# Patient Record
Sex: FEMALE | Race: BLACK OR AFRICAN AMERICAN
[De-identification: names, ages, dates, MRNs, and addresses within clinical notes are randomized per-mention and may not be internally consistent; named-entity substitution may affect disease eponyms.]

---

## 2022-01-31 ENCOUNTER — HOSPITAL ENCOUNTER (OUTPATIENT)
Dept: HOSPITAL 61 - SURGPAT | Age: 72
End: 2022-01-31
Attending: ORTHOPAEDIC SURGERY
Payer: MEDICARE

## 2022-01-31 DIAGNOSIS — M47.819: ICD-10-CM

## 2022-01-31 DIAGNOSIS — I10: ICD-10-CM

## 2022-01-31 DIAGNOSIS — Z79.899: ICD-10-CM

## 2022-01-31 DIAGNOSIS — M17.12: ICD-10-CM

## 2022-01-31 DIAGNOSIS — Z01.818: Primary | ICD-10-CM

## 2022-01-31 LAB
ALBUMIN SERPL-MCNC: 3.3 G/DL (ref 3.4–5)
ANION GAP SERPL CALC-SCNC: 8 MMOL/L (ref 6–14)
APTT BLD: 48 SEC (ref 24–38)
BASOPHILS # BLD AUTO: 0 X10^3/UL (ref 0–0.2)
BASOPHILS NFR BLD: 1 % (ref 0–3)
BUN SERPL-MCNC: 20 MG/DL (ref 7–20)
CALCIUM SERPL-MCNC: 9.4 MG/DL (ref 8.5–10.1)
CHLORIDE SERPL-SCNC: 104 MMOL/L (ref 98–107)
CO2 SERPL-SCNC: 31 MMOL/L (ref 21–32)
CREAT SERPL-MCNC: 0.9 MG/DL (ref 0.6–1)
EOSINOPHIL NFR BLD: 0.1 X10^3/UL (ref 0–0.7)
EOSINOPHIL NFR BLD: 2 % (ref 0–3)
ERYTHROCYTE [DISTWIDTH] IN BLOOD BY AUTOMATED COUNT: 14.1 % (ref 11.5–14.5)
GFR SERPLBLD BASED ON 1.73 SQ M-ARVRAT: 74.9 ML/MIN
GLUCOSE SERPL-MCNC: 102 MG/DL (ref 70–99)
HCT VFR BLD CALC: 42.8 % (ref 36–47)
HGB BLD-MCNC: 13.5 G/DL (ref 12–15.5)
LYMPHOCYTES # BLD: 1.4 X10^3/UL (ref 1–4.8)
LYMPHOCYTES NFR BLD AUTO: 24 % (ref 24–48)
MCH RBC QN AUTO: 26 PG (ref 25–35)
MCHC RBC AUTO-ENTMCNC: 31 G/DL (ref 31–37)
MCV RBC AUTO: 84 FL (ref 79–100)
MONO #: 0.4 X10^3/UL (ref 0–1.1)
MONOCYTES NFR BLD: 8 % (ref 0–9)
NEUT #: 3.7 X10^3/UL (ref 1.8–7.7)
NEUTROPHILS NFR BLD AUTO: 65 % (ref 31–73)
PLATELET # BLD AUTO: 333 X10^3/UL (ref 140–400)
POTASSIUM SERPL-SCNC: 5 MMOL/L (ref 3.5–5.1)
PROTHROMBIN TIME: 12.2 SEC (ref 11.7–14)
RBC # BLD AUTO: 5.09 X10^6/UL (ref 3.5–5.4)
SODIUM SERPL-SCNC: 143 MMOL/L (ref 136–145)
WBC # BLD AUTO: 5.6 X10^3/UL (ref 4–11)

## 2022-01-31 PROCEDURE — 82040 ASSAY OF SERUM ALBUMIN: CPT

## 2022-01-31 PROCEDURE — 85025 COMPLETE CBC W/AUTO DIFF WBC: CPT

## 2022-01-31 PROCEDURE — 87641 MR-STAPH DNA AMP PROBE: CPT

## 2022-01-31 PROCEDURE — 85610 PROTHROMBIN TIME: CPT

## 2022-01-31 PROCEDURE — 80048 BASIC METABOLIC PNL TOTAL CA: CPT

## 2022-01-31 PROCEDURE — 85730 THROMBOPLASTIN TIME PARTIAL: CPT

## 2022-01-31 PROCEDURE — 36415 COLL VENOUS BLD VENIPUNCTURE: CPT

## 2022-01-31 PROCEDURE — 82306 VITAMIN D 25 HYDROXY: CPT

## 2022-01-31 PROCEDURE — 83036 HEMOGLOBIN GLYCOSYLATED A1C: CPT

## 2022-01-31 PROCEDURE — 71046 X-RAY EXAM CHEST 2 VIEWS: CPT

## 2022-01-31 PROCEDURE — 93005 ELECTROCARDIOGRAM TRACING: CPT

## 2022-01-31 PROCEDURE — 85651 RBC SED RATE NONAUTOMATED: CPT

## 2022-01-31 NOTE — RAD
EXAMINATION: XR CHEST 2V



CLINICAL HISTORY: Preoperative clearance.



EXAM DATE/TIME: 1/31/2022 12:30 PM



COMPARISON: None





FINDINGS: 



Lines, Tubes, and Devices: None.



Cardiomediastinal Silhouette: Normal heart size. Tortuosity of the thoracic aorta.



Lungs and Pleura: No evidence of focal airspace consolidation or pleural effusion. Pulmonary vasculat
ure unremarkable.



Bones and Soft Tissues: Degenerative changes in the thoracic spine.

 



IMPRESSION:



No evidence of acute cardiopulmonary abnormality.



Electronically signed by: Polo Thomas DO (1/31/2022 1:20 PM) [facility]

## 2022-01-31 NOTE — EKG
[facility]

              [address] [place] [address]

Test Date:    2022               Test Time:    12:02:57

Pat Name:     JANAK VOSS              Department:   

Patient ID:   [id number]           Room:          

Gender:       F                        Technician:   SJ

:          1950               Requested By: KENDRICK HARDING

Order Number: [id number]           Reading MD:   Mega Eid

                                 Measurements

Intervals                              Axis          

Rate:         61                       P:            38

FL:           152                      QRS:          2

QRSD:         86                       T:            12

QT:           410                                    

QTc:          418                                    

                           Interpretive Statements

SINUS RHYTHM

Electronically Signed On 2022 9:05:33 CST by Mega Eid

## 2022-02-01 LAB — HBA1C MFR BLD: 6.3 % (ref 4.8–5.6)

## 2022-02-19 ENCOUNTER — HOSPITAL ENCOUNTER (INPATIENT)
Dept: HOSPITAL 61 - ER | Age: 72
LOS: 2 days | Discharge: HOME | DRG: 862 | End: 2022-02-21
Attending: INTERNAL MEDICINE | Admitting: INTERNAL MEDICINE
Payer: MEDICARE

## 2022-02-19 VITALS — HEIGHT: 67 IN | WEIGHT: 181.44 LBS | BODY MASS INDEX: 28.48 KG/M2

## 2022-02-19 VITALS — DIASTOLIC BLOOD PRESSURE: 65 MMHG | SYSTOLIC BLOOD PRESSURE: 101 MMHG

## 2022-02-19 DIAGNOSIS — M19.90: ICD-10-CM

## 2022-02-19 DIAGNOSIS — Z79.01: ICD-10-CM

## 2022-02-19 DIAGNOSIS — T81.41XA: Primary | ICD-10-CM

## 2022-02-19 DIAGNOSIS — Z87.891: ICD-10-CM

## 2022-02-19 DIAGNOSIS — E78.00: ICD-10-CM

## 2022-02-19 DIAGNOSIS — M25.462: ICD-10-CM

## 2022-02-19 DIAGNOSIS — E43: ICD-10-CM

## 2022-02-19 DIAGNOSIS — Z90.710: ICD-10-CM

## 2022-02-19 DIAGNOSIS — I10: ICD-10-CM

## 2022-02-19 DIAGNOSIS — E78.5: ICD-10-CM

## 2022-02-19 DIAGNOSIS — Z96.652: ICD-10-CM

## 2022-02-19 DIAGNOSIS — K21.9: ICD-10-CM

## 2022-02-19 DIAGNOSIS — Z20.822: ICD-10-CM

## 2022-02-19 DIAGNOSIS — G89.29: ICD-10-CM

## 2022-02-19 LAB
ALBUMIN SERPL-MCNC: 2.5 G/DL (ref 3.4–5)
ALBUMIN/GLOB SERPL: 0.5 {RATIO} (ref 1–1.7)
ALP SERPL-CCNC: 99 U/L (ref 46–116)
ALT SERPL-CCNC: 18 U/L (ref 14–59)
ANION GAP SERPL CALC-SCNC: 8 MMOL/L (ref 6–14)
APTT PPP: YELLOW S
AST SERPL-CCNC: 21 U/L (ref 15–37)
BACTERIA #/AREA URNS HPF: (no result) /HPF
BASOPHILS # BLD AUTO: 0 X10^3/UL (ref 0–0.2)
BASOPHILS NFR BLD: 1 % (ref 0–3)
BILIRUB SERPL-MCNC: 0.6 MG/DL (ref 0.2–1)
BILIRUB UR QL STRIP: NEGATIVE
BUN SERPL-MCNC: 17 MG/DL (ref 7–20)
BUN/CREAT SERPL: 17 (ref 6–20)
CALCIUM SERPL-MCNC: 8.8 MG/DL (ref 8.5–10.1)
CHLORIDE SERPL-SCNC: 104 MMOL/L (ref 98–107)
CO2 SERPL-SCNC: 31 MMOL/L (ref 21–32)
CREAT SERPL-MCNC: 1 MG/DL (ref 0.6–1)
CRP SERPL-MCNC: 144.3 MG/L (ref 0–3.3)
EOSINOPHIL NFR BLD: 0.1 X10^3/UL (ref 0–0.7)
EOSINOPHIL NFR BLD: 1 % (ref 0–3)
ERYTHROCYTE [DISTWIDTH] IN BLOOD BY AUTOMATED COUNT: 14 % (ref 11.5–14.5)
FIBRINOGEN PPP-MCNC: (no result) MG/DL
GFR SERPLBLD BASED ON 1.73 SQ M-ARVRAT: 66.3 ML/MIN
GLUCOSE SERPL-MCNC: 120 MG/DL (ref 70–99)
HCT VFR BLD CALC: 33.9 % (ref 36–47)
HGB BLD-MCNC: 10.9 G/DL (ref 12–15.5)
INFLUENZA A PATIENT: NEGATIVE
INFLUENZA B PATIENT: NEGATIVE
LYMPHOCYTES # BLD: 0.7 X10^3/UL (ref 1–4.8)
LYMPHOCYTES NFR BLD AUTO: 8 % (ref 24–48)
MCH RBC QN AUTO: 27 PG (ref 25–35)
MCHC RBC AUTO-ENTMCNC: 32 G/DL (ref 31–37)
MCV RBC AUTO: 82 FL (ref 79–100)
MONO #: 0.6 X10^3/UL (ref 0–1.1)
MONOCYTES NFR BLD: 6 % (ref 0–9)
NEUT #: 7.7 X10^3/UL (ref 1.8–7.7)
NEUTROPHILS NFR BLD AUTO: 85 % (ref 31–73)
NITRITE UR QL STRIP: NEGATIVE
PH UR STRIP: 5.5 [PH]
PLATELET # BLD AUTO: 500 X10^3/UL (ref 140–400)
POTASSIUM SERPL-SCNC: 3.9 MMOL/L (ref 3.5–5.1)
PROT SERPL-MCNC: 7.6 G/DL (ref 6.4–8.2)
PROT UR STRIP-MCNC: NEGATIVE MG/DL
RBC # BLD AUTO: 4.13 X10^6/UL (ref 3.5–5.4)
RBC #/AREA URNS HPF: 0 /HPF (ref 0–2)
SODIUM SERPL-SCNC: 143 MMOL/L (ref 136–145)
UROBILINOGEN UR-MCNC: 1 MG/DL
WBC # BLD AUTO: 9.1 X10^3/UL (ref 4–11)
WBC #/AREA URNS HPF: (no result) /HPF (ref 0–4)

## 2022-02-19 PROCEDURE — 85610 PROTHROMBIN TIME: CPT

## 2022-02-19 PROCEDURE — 81001 URINALYSIS AUTO W/SCOPE: CPT

## 2022-02-19 PROCEDURE — 87040 BLOOD CULTURE FOR BACTERIA: CPT

## 2022-02-19 PROCEDURE — 85651 RBC SED RATE NONAUTOMATED: CPT

## 2022-02-19 PROCEDURE — 87428 SARSCOV & INF VIR A&B AG IA: CPT

## 2022-02-19 PROCEDURE — 85025 COMPLETE CBC W/AUTO DIFF WBC: CPT

## 2022-02-19 PROCEDURE — 71045 X-RAY EXAM CHEST 1 VIEW: CPT

## 2022-02-19 PROCEDURE — 96374 THER/PROPH/DIAG INJ IV PUSH: CPT

## 2022-02-19 PROCEDURE — 73564 X-RAY EXAM KNEE 4 OR MORE: CPT

## 2022-02-19 PROCEDURE — 86140 C-REACTIVE PROTEIN: CPT

## 2022-02-19 PROCEDURE — G0378 HOSPITAL OBSERVATION PER HR: HCPCS

## 2022-02-19 PROCEDURE — 80053 COMPREHEN METABOLIC PANEL: CPT

## 2022-02-19 PROCEDURE — 96375 TX/PRO/DX INJ NEW DRUG ADDON: CPT

## 2022-02-19 PROCEDURE — 93971 EXTREMITY STUDY: CPT

## 2022-02-19 PROCEDURE — 36415 COLL VENOUS BLD VENIPUNCTURE: CPT

## 2022-02-19 RX ADMIN — OXYCODONE HYDROCHLORIDE AND ACETAMINOPHEN PRN TAB: 5; 325 TABLET ORAL at 19:44

## 2022-02-19 RX ADMIN — ATORVASTATIN CALCIUM SCH MG: 20 TABLET, FILM COATED ORAL at 20:44

## 2022-02-19 NOTE — PHYS DOC
Past Medical History


Past Surgical History:  Hysterectomy, Knee Replacement, Tubal ligation, Other


Additional Past Surgical Histo:  GASTRIC SLEEVE


 (ARPITA STEVEN APRJENELLE)


Smoking Status:  Current Some Day Smoker


 (ARPITA STEVEN APRJENELLE)





General Adult


EDM:


Chief Complaint:  FEVER





HPI:


HPI:





Patient is a 71  year old female who presents with fever for the last 4 days.  

She had a knee replacement done by Dr. Cummings on  and was discharged 

on  to rehab and got home 5 days ago.  She states she is unable to 

keep her fever away.  She states she also has a lot of pain and has been taking 

her Percocet but she is still having pain.  States the pain mainly is in the 

knee itself but down into the shin area too.  Patient states she called the 

doctor and they stated for her to come into the emergency room to be checked.  

She does have a scheduled postoperative appointment on this on Monday.  Patient 

states she last took her Percocet about 1030 this morning.  She does have a low-

grade fever upon arrival.  Patient has a history of hypertension, high 

cholesterol, DJD, gastric sleeve, hysterectomy, tubal ligation and smoking.


 (ARPITA STEVEN APRN)





Review of Systems:


Review of Systems:


Constitutional:   + fever or +chills. []


Eyes:   Denies change in visual acuity. []


HENT:   Denies nasal congestion or sore throat. [] 


Respiratory:   Denies cough or shortness of breath. [] 


Cardiovascular:   Denies chest pain or edema. [] 


GI:   Denies abdominal pain, nausea, vomiting, bloody stools or diarrhea. [] 


:  Denies dysuria. [] 


Musculoskeletal:   Denies back pain or +left knee joint pain. [] 


Integument:   Denies rash. +redness to medial knee [] 


Neurologic:   Denies headache, focal weakness or sensory changes. [] 


Endocrine:   Denies polyuria or polydipsia. [] 


Lymphatic:  Denies swollen glands. [] 


Psychiatric:  Denies depression or anxiety. []


 (ARPITA STEVEN APRJENELLE)





Heart Score:


C/O Chest Pain:  No


 (ARPITA STEVEN)





Current Medications:





Current Medications








 Medications


  (Trade)  Dose


 Ordered  Sig/Nicole  Start Time


 Stop Time Status Last Admin


Dose Admin


 


 Fentanyl Citrate


  (Fentanyl 2ml


 Vial)  50 mcg  1X  ONCE  22 14:15


 22 14:16 UNV  











 (ARPITA STEVEN)





Allergies:


Allergies:





Allergies








Coded Allergies Type Severity Reaction Last Updated Verified


 


  Penicillins Allergy Intermediate  22 Yes








 (ARPITA STEVEN)





Physical Exam:


PE:





Constitutional: Well developed, well nourished, no acute distress, non-toxic shaji

earance. []


HENT: Normocephalic, atraumatic, bilateral external ears normal, oropharynx 

moist, no oral exudates, nose normal. []


Eyes: PERRLA, EOMI, conjunctiva normal, no discharge. [] 


Neck: Normal range of motion, no tenderness, supple, no stridor. [] 


Cardiovascular:Heart rate regular rhythm, no murmur []


Lungs & Thorax:  Bilateral breath sounds clear to auscultation []


Abdomen: Bowel sounds normal, soft, no tenderness, no masses, no pulsatile 

masses. [] 


Skin: Warm, dry, medial left knee erythema, no rash. [] 


Back: No tenderness, no CVA tenderness. [] 


Extremities:  Slight over incision and down into shin tenderness, no cyanosis, 

no clubbing, ROM intact but painful, 1+ edema. [] 


Neurologic: Alert and oriented X 3, normal motor function, normal sensory 

function, no focal deficits noted. []


Psychologic: Affect normal, judgement normal, mood normal. []


 (ARPITA STEVEN)





Current Patient Data:


Vital Signs:





                                   Vital Signs








  Date Time  Temp Pulse Resp B/P (MAP) Pulse Ox O2 Delivery O2 Flow Rate FiO2


 


22 13:50 99.3 98 22 129/68 (88) 95 Room Air  





 99.3       








 (ARPITA STEVEN)





EKG:


EKG:


[]


 (ARPITA STEVEN)





Radiology/Procedures:


Radiology/Procedures:


[]


Impression:


                            [facility]


                    [address] [place] [address] [phone]


                                        


                                 IMAGING REPORT





                                     Signed





PATIENT: JANAK VOSS    ACCOUNT: [id number]     MRN#: [id number]


: 1950           LOCATION: ER              AGE: 71


SEX: F                    EXAM DT: 22         ACCESSION#: [id number]


STATUS: REG ER            ORD. PHYSICIAN: ARPITA STEVEN


REASON: PAIN POSTOPERATIVELY WITH FEVER


PROCEDURE: KNEE LEFT 4V








XR KNEE _4 VIEWS WITH PATELLA_LT





Clinical Indication: Reason: PAIN POSTOPERATIVELY WITH FEVER / Spl. 

Instructions:  / History: 





Comparison: Left knee, 2 views 2022.





Findings: 


There is left hip arthroplasty. The alignment remains anatomic. Surgical drain 

has been removed. There is no acute fracture. Resurfacing of the patella. There 

is patellar enthesophyte. There are patellar osteophytes. Patella in anatomic 

position. No patellar tilt or subluxation on sunrise view. There is mild to 

moderate joint effusion.





IMPRESSION:


1.  No acute bone abnormality.


2.  Mild to moderate joint effusion.





Electronically signed by: Miguel Ángel Milian MD (2022 3:19 PM) [facility]














DICTATED and SIGNED BY:     MIGUEL ÁNGEL MILIAN MD


DATE:     22 [id number] 0





                            [facility]


                    [address] [place], [place] [id number] [phone]


                                        


                                 IMAGING REPORT





                                     Signed





PATIENT: JANAK VOSS    ACCOUNT: [id number]     MRN#: [id number]


: 1950           LOCATION: ER              AGE: 71


SEX: F                    EXAM DT: 22         ACCESSION#: [id number]


STATUS: REG ER            ORD. PHYSICIAN: ARPITA STEVEN


REASON: post knee replacement leg pain


PROCEDURE: VENOUS LOWER EXTREMITY LEFT





EXAM: Left lower extremity venous Doppler.





HISTORY: Left lower extremity pain/swelling. Recent orthopedic surgery, fever.





COMPARISON: None.





FINDINGS: Grayscale and Doppler analysis of the left lower extremity deep venous

 system was performed with graded compression and augmentation. The common 

femoral, greater saphenous, superficial femoral, popliteal and calf veins were 

assessed.





There is no evidence of deep venous thrombosis.





IMPRESSION: 


1. No evidence of deep venous thrombosis.





Electronically signed by: FATOUMATA Casas MD (2022 4:21 PM) [id number]














DICTATED and SIGNED BY:     ADIS CASAS MD


DATE:     22 [id number] 0





                            [facility]


                    [address] [place], [place] [address] [phone]


                                        


                                 IMAGING REPORT





                                     Signed





PATIENT: JANAK VOSS    ACCOUNT: [id number]     MRN#: [id number]


: 1950           LOCATION: ER              AGE: 71


SEX: F                    EXAM DT: 22         ACCESSION#: [id number]


STATUS: REG ER            ORD. PHYSICIAN: ARPITA STEVEN


REASON: fever


PROCEDURE: PORTABLE CHEST 1V





EXAM: Chest, single view.





HISTORY: Fever





COMPARISON: 2022





FINDINGS: A frontal view of the chest is obtained. There is no infiltrate, 

pleural effusion or pneumothorax. The heart is normal in size. There is a small 

hiatal hernia.





IMPRESSION: No acute pulmonary finding.





Electronically signed by: Jocelyn Levin MD (2022 4:17 PM) [facility]














DICTATED and SIGNED BY:     JOCELYN LEVIN MD


DATE:     22 [id number] 0


 (ARPITA STEVEN)





Course & Med Decision Making:


Course & Med Decision Making


Pertinent Labs and Imaging studies reviewed. (See chart for details)





See HPI.  Alert and oriented x4.  Ambulatory with a walker.  Incision line is 

not reddened and there is no drainage and does not appear infected.  She does 

have some redness that looks like it could be a hematoma to the medial left 

knee.  There does appear to be some warmth to that area also.  Also there is so

me redness going down over the shin area.  She has a low-grade fever at this 

time.  Pedal pulse strong and present.  1+ edema at the knee.  Tenderness just 

over the incision area and down over the shin bone.  She does have range of 

motion in the knee but is painful.





CRP and ESR elevated.  I spoke to Dr. Vivas and he states to meet the patient but

 do not start antibiotics as he will drain the knee and send it off for culture 

tomorrow morning.  Patient admitted to the hospitalist.





[]


 (ARPITA STEVEN)





Dragon Disclaimer:


Dragon Disclaimer:


This electronic medical record was generated, in whole or in part, using a voice

 recognition dictation system.


 (ARPITA STEVEN)





Departure


Departure


Impression:  


   Primary Impression:  


   Post-operative infection


   Qualified Codes:  T81.41XA - Infection following a procedure, superficial 

   incisional surgical site, initial encounter


   Additional Impression:  


   Fever


   Qualified Codes:  R50.9 - Fever, unspecified


Disposition:   ADMITTED AS INPATIENT


Admitting Physician:  HIMS


 (ARPITA STEVEN)


Condition:  STABLE


Referrals:  


FRED SAPP MD (PCP)











ARPITA STEVEN            2022 14:24


ADIS PASCAL MD              2022 16:14

## 2022-02-19 NOTE — RAD
EXAM: Chest, single view.



HISTORY: Fever



COMPARISON: 1/31/2022



FINDINGS: A frontal view of the chest is obtained. There is no infiltrate, pleural effusion or pneumo
thorax. The heart is normal in size. There is a small hiatal hernia.



IMPRESSION: No acute pulmonary finding.



Electronically signed by: Jocelyn Borden MD (2/19/2022 4:17 PM) [facility]

## 2022-02-19 NOTE — RAD
EXAM: Left lower extremity venous Doppler.



HISTORY: Left lower extremity pain/swelling. Recent orthopedic surgery, fever.



COMPARISON: None.



FINDINGS: Grayscale and Doppler analysis of the left lower extremity deep venous system was performed
 with graded compression and augmentation. The common femoral, greater saphenous, superficial femoral
, popliteal and calf veins were assessed.



There is no evidence of deep venous thrombosis.



IMPRESSION: 

1. No evidence of deep venous thrombosis.



Electronically signed by: FATOUMATA Casas MD (2/19/2022 4:21 PM) [facility]

## 2022-02-19 NOTE — PDOC2
CONSULT


Date of Consult


Date of Consult


DATE: 22 


TIME: 16:52





History of Present Illness


Reason for Visit:


Patient is a 71-year old female who presents with fever for the last 4 days.  

She had a knee replacement done by Dr. Cummings on .  She states she is 

unable to keep her fever away.  She states she also has a lot of pain and has 

been taking her Percocet but she is still having pain.  States the pain mainly 

is in the knee itself but down into the shin area too.  She does have a 

scheduled postoperative appointment on this on Monday. She did have a 

temperature of 99.3 on arrival.  Patient has a history of hypertension, high 

cholesterol, DJD, gastric sleeve, hysterectomy, tubal ligation and smoking. 

Since admission she has not had a fever.





Past Medical History


Past Medical History


Recent knee replacement; polypharmacy; anticoagulation after her recent surgery,

she is on Coumadin; hyperlipidemia; chronic pain; arthritis; hypertension and 

GERD. Patient has a history of hypertension, high cholesterol, DJD, gastric 

sleeve, hysterectomy, tubal ligation and smoking.





Past Surgical History


Past Surgical History:  Total knee replacement, Hysterectomy





Family History


Family History


GERD





Social History


No


ALCOHOL:  none


Lives:  with Family





Current Problem List


Problem List


Problems


Medical Problems:


(1) Fever


Status: Acute  





(2) Post-operative infection


Status: Acute  











Current Medications


Current Medications





Current Medications


Fentanyl Citrate (Fentanyl 2ml Vial) 50 mcg 1X  ONCE IVP  Last administered on 

22at 14:52;  Start 22 at 14:15;  Stop 22 at 14:16;  Status DC


Ketorolac Tromethamine (Toradol 30mg Vial) 30 mg 1X  ONCE IVP  Last administered

on 22at 14:52;  Start 22 at 14:15;  Stop 22 at 14:16;  Status DC


Acetaminophen (Tylenol) 650 mg PRN Q4HRS  PRN PO FEVER > 100.3'F;  Start 22

at 16:15;  Stop 22 at 16:14





Active Scripts


Active


Percocet 5-325 mg Tablet (Oxycodone HCl/Acetaminophen) 1 Each Tablet 1 Tab PO 

QIDPRN PRN MDD 4 Tablet(s)


Reported


Centrum Silver Women Tablet (Multivits-Min/Iron/FA/Lutein) 1 Each Tablet 1 Each 

PO DAILY


Stool Softener Tablet (Sennosides/Docusate Sodium) 1 Each Tablet 1 Each PO DAILY


Klor-Con M20 (Potassium Chloride) 20 Meq Tab.er.prt 20 Meq PO DAILY


Amlodipine Besylate 5 Mg Tablet 5 Mg PO 


Hydrochlorothiazide Tablet (Hydrochlorothiazide) 12.5 Mg Tablet 12.5 Mg PO 


Atorvastatin Calcium 20 Mg Tablet 20 Mg PO 


Omeprazole 40 Mg Capsule.dr 40 Mg PO





Allergies


Allergies:  


Coded Allergies:  


     Penicillins (Verified  Allergy, Intermediate, 22)





ROS


Musculoskeletal:  Yes Joint Pain





Physical Exam


General:  Alert, Cooperative


HEENT:  Atraumatic


Lungs:  Normal air movement


Heart:  Regular rate


Abdomen:  Soft


Extremities:  No edema, Normal pulses, Other (Left knee GWEN dressing was 

removed for exam, and reapplied. The incision is benign. There was report of so

me drainage from the Hemovac site but it is a single pinpoint spot of blood and 

appears benign. The incision is dry and intact and there is no erythema. There 

is a trace effusion which seems normal postoperative. There is a small amount of

faint ecchymosis on the lateral posterior portion of the knee. The calf is soft 

and nontender and the Homans' sign is negative. Range of motion is fairly 

limited, only about 30 to 60 degrees.)


Skin:  No significant lesion


Neuro:  Normal speech, Normal tone, Sensation intact


MUSCULOSKELETAL:  Abnormal exam of left (Knee as above)





Vitals


VITALS





Vital Signs








  Date Time  Temp Pulse Resp B/P (MAP) Pulse Ox O2 Delivery O2 Flow Rate FiO2


 


22 14:52   18  98 Room Air  


 


22 13:50 99.3 98  129/68 (88)    





 99.3       











Labs


Labs





Laboratory Tests








Test


 22


14:40 22


15:10 22


15:40


 


White Blood Count


 9.1 x10^3/uL


(4.0-11.0) 


 





 


Red Blood Count


 4.13 x10^6/uL


(3.50-5.40) 


 





 


Hemoglobin


 10.9 g/dL


(12.0-15.5) 


 





 


Hematocrit


 33.9 %


(36.0-47.0) 


 





 


Mean Corpuscular Volume 82 fL ()   


 


Mean Corpuscular Hemoglobin 27 pg (25-35)   


 


Mean Corpuscular Hemoglobin


Concent 32 g/dL


(31-37) 


 





 


Red Cell Distribution Width


 14.0 %


(11.5-14.5) 


 





 


Platelet Count


 500 x10^3/uL


(140-400) 


 





 


Neutrophils (%) (Auto) 85 % (31-73)   


 


Lymphocytes (%) (Auto) 8 % (24-48)   


 


Monocytes (%) (Auto) 6 % (0-9)   


 


Eosinophils (%) (Auto) 1 % (0-3)   


 


Basophils (%) (Auto) 1 % (0-3)   


 


Neutrophils # (Auto)


 7.7 x10^3/uL


(1.8-7.7) 


 





 


Lymphocytes # (Auto)


 0.7 x10^3/uL


(1.0-4.8) 


 





 


Monocytes # (Auto)


 0.6 x10^3/uL


(0.0-1.1) 


 





 


Eosinophils # (Auto)


 0.1 x10^3/uL


(0.0-0.7) 


 





 


Basophils # (Auto)


 0.0 x10^3/uL


(0.0-0.2) 


 





 


Erythrocyte Sedimentation Rate 78 (0-25)   


 


Sodium Level


 143 mmol/L


(136-145) 


 





 


Potassium Level


 3.9 mmol/L


(3.5-5.1) 


 





 


Chloride Level


 104 mmol/L


() 


 





 


Carbon Dioxide Level


 31 mmol/L


(21-32) 


 





 


Anion Gap 8 (6-14)   


 


Blood Urea Nitrogen


 17 mg/dL


(7-20) 


 





 


Creatinine


 1.0 mg/dL


(0.6-1.0) 


 





 


Estimated GFR


(Cockcroft-Gault) 66.3 


 


 





 


BUN/Creatinine Ratio 17 (6-20)   


 


Glucose Level


 120 mg/dL


(70-99) 


 





 


Calcium Level


 8.8 mg/dL


(8.5-10.1) 


 





 


Total Bilirubin


 0.6 mg/dL


(0.2-1.0) 


 





 


Aspartate Amino Transf


(AST/SGOT) 21 U/L (15-37) 


 


 





 


Alanine Aminotransferase


(ALT/SGPT) 18 U/L (14-59) 


 


 





 


Alkaline Phosphatase


 99 U/L


() 


 





 


C-Reactive Protein,


Quantitative 144.3 mg/L


(0-3.3) 


 





 


Total Protein


 7.6 g/dL


(6.4-8.2) 


 





 


Albumin


 2.5 g/dL


(3.4-5.0) 


 





 


Albumin/Globulin Ratio 0.5 (1.0-1.7)   


 


Influenza Type A Antigen


 


 Negative


(NEGATIVE) 





 


Influenza Type B Antigen


 


 Negative


(NEGATIVE) 





 


SARS-CoV-2 Antigen (Rapid)


 


 Negative


(NEGATIVE) 





 


Urine Collection Type   Unknown 


 


Urine Color   Yellow 


 


Urine Clarity   Cloudy 


 


Urine pH   5.5 (<5.0-8.0) 


 


Urine Specific Gravity


 


 


 1.025


(1.000-1.030)


 


Urine Protein


 


 


 Negative mg/dL


(NEG-TRACE)


 


Urine Glucose (UA)


 


 


 Negative mg/dL


(NEG)


 


Urine Ketones (Stick)


 


 


 Negative mg/dL


(NEG)


 


Urine Blood   Negative (NEG) 


 


Urine Nitrite   Negative (NEG) 


 


Urine Bilirubin   Negative (NEG) 


 


Urine Urobilinogen Dipstick


 


 


 1.0 mg/dL (0.2


mg/dL)


 


Urine Leukocyte Esterase   Negative (NEG) 


 


Urine RBC   0 /HPF (0-2) 


 


Urine WBC   Occ /HPF (0-4) 


 


Urine Squamous Epithelial


Cells 


 


 Mod /LPF 





 


Urine Bacteria


 


 


 Few /HPF


(0-FEW)


 


Urine Mucus   Marked /LPF 








Laboratory Tests








Test


 22


14:40 22


15:10 22


15:40


 


White Blood Count


 9.1 x10^3/uL


(4.0-11.0) 


 





 


Red Blood Count


 4.13 x10^6/uL


(3.50-5.40) 


 





 


Hemoglobin


 10.9 g/dL


(12.0-15.5) 


 





 


Hematocrit


 33.9 %


(36.0-47.0) 


 





 


Mean Corpuscular Volume 82 fL ()   


 


Mean Corpuscular Hemoglobin 27 pg (25-35)   


 


Mean Corpuscular Hemoglobin


Concent 32 g/dL


(31-37) 


 





 


Red Cell Distribution Width


 14.0 %


(11.5-14.5) 


 





 


Platelet Count


 500 x10^3/uL


(140-400) 


 





 


Neutrophils (%) (Auto) 85 % (31-73)   


 


Lymphocytes (%) (Auto) 8 % (24-48)   


 


Monocytes (%) (Auto) 6 % (0-9)   


 


Eosinophils (%) (Auto) 1 % (0-3)   


 


Basophils (%) (Auto) 1 % (0-3)   


 


Neutrophils # (Auto)


 7.7 x10^3/uL


(1.8-7.7) 


 





 


Lymphocytes # (Auto)


 0.7 x10^3/uL


(1.0-4.8) 


 





 


Monocytes # (Auto)


 0.6 x10^3/uL


(0.0-1.1) 


 





 


Eosinophils # (Auto)


 0.1 x10^3/uL


(0.0-0.7) 


 





 


Basophils # (Auto)


 0.0 x10^3/uL


(0.0-0.2) 


 





 


Erythrocyte Sedimentation Rate 78 (0-25)   


 


Sodium Level


 143 mmol/L


(136-145) 


 





 


Potassium Level


 3.9 mmol/L


(3.5-5.1) 


 





 


Chloride Level


 104 mmol/L


() 


 





 


Carbon Dioxide Level


 31 mmol/L


(21-32) 


 





 


Anion Gap 8 (6-14)   


 


Blood Urea Nitrogen


 17 mg/dL


(7-20) 


 





 


Creatinine


 1.0 mg/dL


(0.6-1.0) 


 





 


Estimated GFR


(Cockcroft-Gault) 66.3 


 


 





 


BUN/Creatinine Ratio 17 (6-20)   


 


Glucose Level


 120 mg/dL


(70-99) 


 





 


Calcium Level


 8.8 mg/dL


(8.5-10.1) 


 





 


Total Bilirubin


 0.6 mg/dL


(0.2-1.0) 


 





 


Aspartate Amino Transf


(AST/SGOT) 21 U/L (15-37) 


 


 





 


Alanine Aminotransferase


(ALT/SGPT) 18 U/L (14-59) 


 


 





 


Alkaline Phosphatase


 99 U/L


() 


 





 


C-Reactive Protein,


Quantitative 144.3 mg/L


(0-3.3) 


 





 


Total Protein


 7.6 g/dL


(6.4-8.2) 


 





 


Albumin


 2.5 g/dL


(3.4-5.0) 


 





 


Albumin/Globulin Ratio 0.5 (1.0-1.7)   


 


Influenza Type A Antigen


 


 Negative


(NEGATIVE) 





 


Influenza Type B Antigen


 


 Negative


(NEGATIVE) 





 


SARS-CoV-2 Antigen (Rapid)


 


 Negative


(NEGATIVE) 





 


Urine Collection Type   Unknown 


 


Urine Color   Yellow 


 


Urine Clarity   Cloudy 


 


Urine pH   5.5 (<5.0-8.0) 


 


Urine Specific Gravity


 


 


 1.025


(1.000-1.030)


 


Urine Protein


 


 


 Negative mg/dL


(NEG-TRACE)


 


Urine Glucose (UA)


 


 


 Negative mg/dL


(NEG)


 


Urine Ketones (Stick)


 


 


 Negative mg/dL


(NEG)


 


Urine Blood   Negative (NEG) 


 


Urine Nitrite   Negative (NEG) 


 


Urine Bilirubin   Negative (NEG) 


 


Urine Urobilinogen Dipstick


 


 


 1.0 mg/dL (0.2


mg/dL)


 


Urine Leukocyte Esterase   Negative (NEG) 


 


Urine RBC   0 /HPF (0-2) 


 


Urine WBC   Occ /HPF (0-4) 


 


Urine Squamous Epithelial


Cells 


 


 Mod /LPF 





 


Urine Bacteria


 


 


 Few /HPF


(0-FEW)


 


Urine Mucus   Marked /LPF 











Images


Images


[facility]  


                              [address] [place] [address]  


                                           [phone]  


 


                                           IMAGING REPORT  


 


                                               Signed  


 


PATIENT: JANAK VOSS           ACCOUNT: [id number]            MRN#: 

[id number]  


: 1950                  LOCATION: ER                     AGE: 71  


SEX: F                           EXAM DT: 22                ACCESSION#: 

[id number]  


STATUS: REG ER                   ORD. PHYSICIAN: ARPITA STEVEN  


REASON: PAIN POSTOPERATIVELY WITH FEVER  


PROCEDURE: KNEE LEFT 4V  


 


 


 XR KNEE _4 VIEWS WITH PATELLA_LT  


 


 Clinical Indication: Reason: PAIN POSTOPERATIVELY WITH FEVER / Spl. 

Instructions:  / History:   


 


 Comparison: Left knee, 2 views 2022.  


 


 Findings:   


 There is left hip arthroplasty. The alignment remains anatomic. Surgical drain 

has been removed. 


There is no acute fracture. Resurfacing of the patella. There is patellar 

enthesophyte. There are 


patellar osteophytes. Patella in anatomic position. No patellar tilt or sub

luxation on sunrise view.


There is mild to moderate joint effusion.  


 


 IMPRESSION:  


 1.  No acute bone abnormality.  


 2.  Mild to moderate joint effusion.  


 


 Electronically signed by: Miguel Ángel Milian MD (2022 3:19 PM) [facility]  








PATIENT: JANAK VOSS    ACCOUNT: [id number]     MRN#: [id number]


: 1950           LOCATION: ER              AGE: 71


SEX: F                    EXAM DT: 22         ACCESSION#: [id number]


STATUS: REG ER            ORD. PHYSICIAN: ARPITA STEVEN


REASON: post knee replacement leg pain


PROCEDURE: VENOUS LOWER EXTREMITY LEFT





EXAM: Left lower extremity venous Doppler.





HISTORY: Left lower extremity pain/swelling. Recent orthopedic surgery, fever.





COMPARISON: None.





FINDINGS: Grayscale and Doppler analysis of the left lower extremity deep venous

system was performed with graded compression and augmentation. The common 

femoral, greater saphenous, superficial femoral, popliteal and calf veins were a

ssessed.





There is no evidence of deep venous thrombosis.





IMPRESSION: 


1. No evidence of deep venous thrombosis.





Assessment/Plan


Assessment/Plan


Her most recent temperature is 98.6. The elevated sed rate and C-reactive pro

tein are mildly concerning but these are always somewhat elevated after a major 

surgery. We checked a UA and a Doppler to make sure those were not sources of 

the elevated labs. The knee limited range of motion and report of pain are also 

mildly concerning. The knee does not look infected clinically. I contacted Dr. Cummings through secure messaging and informed him of the patient's admission and 

my findings. I had plans to aspirate the knee today, and had ordered the local 

anesthetic to do so, but looking at this knee it appears relatively benign.  I'm

going to leave the aspiration decision to Dr. Cummings, who has a follow up 

appointment with the patient tomorrow in the clinic, or if the patient is still 

in the hospital.  I would not start antibiotics. If antibiotics are felt 

necessary, then I would definitely aspirate the knee.  There is low but nonzero 

risk of introducing infection by aspiration. I discussed all of this with the 

patient and her family and they agree.











BRIDGET MARTINES MD                 2022 16:53

## 2022-02-19 NOTE — HP
DATE OF SERVICE: 02/19/2022

ADMIT DATE: 02/19/2022

CHIEF COMPLAINT:  Left knee pain and fevers.



HISTORY OF PRESENT ILLNESS:  The patient is a pleasant 71-year-old female who 

had knee replacement on 01/31, about 19 days ago.  Over the past 4 days, she 

started developing some fevers.  She apparently did go to rehab for a while and 

did well there. While in the ER, we have evaluated her and noticed that her knee

is having some mild-to-moderate joint effusion.  I discussed the case with ER 

physician.  We are going to admit the patient and consult Dr. Cummings.



PAST MEDICAL HISTORY:  Recent knee replacement; polypharmacy; chronic 

anticoagulation after her recent surgery, she is on Coumadin; hyperlipidemia; 

chronic pain; arthritis; hypertension and GERD.



ALLERGIES:  PENICILLIN.



FAMILY HISTORY:  GERD.



SOCIAL HISTORY:  She does not drink, smoke or take drugs.  She is .  Her 

 is a retired .  She is also retired.



MEDICATIONS:  Reviewed.  She is on Coumadin, atorvastatin, amlodipine, Percocet,

potassium chloride, hydrochlorothiazide, senna, omeprazole and vitamins.



REVIEW OF SYSTEMS:

GENERAL:  She complains of fevers.

SKIN:  No bruising, hair changes or rashes.

EYES:  No blurred, double or loss of vision.

NOSE AND THROAT:  No history of nosebleeds, hoarseness or sore throat.

HEART:  No history of palpitations, chest pain or shortness of breath on 

exertion.

LUNGS:  Denies cough, hemoptysis, wheezing or shortness of breath.

GASTROINTESTINAL:  Denies changes in appetite, nausea, vomiting, diarrhea or 

constipation.

GENITOURINARY:  No history of frequency, urgency, hesitancy or nocturia.

NEUROLOGIC:  Denies history of numbness, tingling, tremor or weakness.

PSYCHIATRIC:  No history of panic, anxiety or depression.

ENDOCRINE:  No history of heat or cold intolerance, polyuria or polydipsia.

EXTREMITIES:  She complains of left knee pain.



PHYSICAL EXAMINATION:

VITALS:  Within normal limits and are stable.

GENERAL:  No apparent distress.  Alert and oriented.

HEENT:  Normal cephalic atraumatic, external auditory canals are patent

EYES:  Extraocular muscles are intact, pupils are equally round and reactive to 

light and accommodation

MUSCULOSKELETAL:  Well developed, well nourished, good range of motion

ENDOCRINE:  No thyromegaly was palpated

LYMPHATICS:  No cervical chain or axillary nodes were noted

HEMATOPOIETIC:  No bruising

NECK:  Supple, no JVD, no thyromegaly was noted.

LUNGS:  Clear to auscultation in all lung fields without rhonchi or wheezing.

HEART:  RRR, S1, S2 present.  Peripheral pulses intact, no obvious murmurs were 

noted.

ABDOMEN:  Soft, nontender.  Positive bowel sounds no organomegaly, normal bowel 

sounds.

EXTREMITIES:  The left knee is somewhat swollen.  There is clean, dry, intact 

dressing with a GWNE drain in place, which is not currently hooked up.

NEUROLOGIC:  Normal speech, normal tone.  A and O x 3, moves all extremities, no

obvious focal deficits.

PSYCHIATRIC:  Normal affect, normal mood.  Stable.

SKIN:  No ulcerations or rashes, good skin turgor, no jaundice.

VASCULAR:  Good capillary refill, neurovascular bundle appears to be intact.



LABORATORY DATA:  Electrolytes are normal.  Hemoglobin is 10.9.  Urinalysis 

negative.  COVID testing is negative.  Chest x-ray is negative.  Knee x-ray 

shows an effusion.



ASSESSMENT AND PLAN:  Left knee effusion, suspect possible infection versus 

inflammatory process.  The ER doctor spoke with Orthopedic Surgery, they would 

like to aspirate the joint in the morning.  We are going to hold off on 

antibiotics until the joint is aspirated.  PRN pain meds, home meds. DVT 

prophylaxis.  Full code.







KYLEE/PAR/AMI

DR: KYLEE/bean   DD: 02/19/2022 19:24

DT: 02/19/2022 20:06   TID: [id number]

## 2022-02-19 NOTE — RAD
XR KNEE _4 VIEWS WITH PATELLA_LT



Clinical Indication: Reason: PAIN POSTOPERATIVELY WITH FEVER / Spl. Instructions:  / History: 



Comparison: Left knee, 2 views February 8, 2022.



Findings: 

There is left hip arthroplasty. The alignment remains anatomic. Surgical drain has been removed. Ther
e is no acute fracture. Resurfacing of the patella. There is patellar enthesophyte. There are patella
r osteophytes. Patella in anatomic position. No patellar tilt or subluxation on sunrise view. There i
s mild to moderate joint effusion.



IMPRESSION:

1.  No acute bone abnormality.

2.  Mild to moderate joint effusion.



Electronically signed by: Miguel Ángel Milian MD (2/19/2022 3:19 PM) [facility]

## 2022-02-20 VITALS — SYSTOLIC BLOOD PRESSURE: 108 MMHG | DIASTOLIC BLOOD PRESSURE: 61 MMHG

## 2022-02-20 VITALS — SYSTOLIC BLOOD PRESSURE: 108 MMHG | DIASTOLIC BLOOD PRESSURE: 73 MMHG

## 2022-02-20 VITALS — SYSTOLIC BLOOD PRESSURE: 111 MMHG | DIASTOLIC BLOOD PRESSURE: 58 MMHG

## 2022-02-20 VITALS — DIASTOLIC BLOOD PRESSURE: 59 MMHG | SYSTOLIC BLOOD PRESSURE: 103 MMHG

## 2022-02-20 VITALS — DIASTOLIC BLOOD PRESSURE: 62 MMHG | SYSTOLIC BLOOD PRESSURE: 107 MMHG

## 2022-02-20 LAB — PROTHROMBIN TIME: 22.2 SEC (ref 11.7–14)

## 2022-02-20 RX ADMIN — POTASSIUM CHLORIDE SCH MEQ: 1500 TABLET, EXTENDED RELEASE ORAL at 09:40

## 2022-02-20 RX ADMIN — OXYCODONE HYDROCHLORIDE AND ACETAMINOPHEN PRN TAB: 5; 325 TABLET ORAL at 15:12

## 2022-02-20 RX ADMIN — OXYCODONE HYDROCHLORIDE AND ACETAMINOPHEN PRN TAB: 5; 325 TABLET ORAL at 09:47

## 2022-02-20 RX ADMIN — MULTIPLE VITAMINS W/ MINERALS TAB SCH TAB: TAB at 09:39

## 2022-02-20 RX ADMIN — ATORVASTATIN CALCIUM SCH MG: 20 TABLET, FILM COATED ORAL at 20:33

## 2022-02-20 RX ADMIN — OXYCODONE HYDROCHLORIDE AND ACETAMINOPHEN PRN TAB: 5; 325 TABLET ORAL at 20:33

## 2022-02-20 NOTE — PDOC
[employer] PROGRESS NOTE


Date of Service


DOS:


DATE: 2/20/22 


TIME: 12:20





Chief Complaint


Chief Complaint


Left knee swelling pain (?  Infection versus inflammation in parentheses)


Recent knee replacement; polypharmacy; chronic 


anticoagulation after her recent surgery, she is on Coumadin; hyperlipidemia; 


chronic pain; arthritis; hypertension and GERD.





History of Present Illness


History of Present Illness


2/20/2022


Patient seen and examined


Discussed with RN


Chart reviewed


The left knee is still painful swollen and warm to touch


Awaiting Ortho input





Vitals/I&O


Vitals/I&O:





                                   Vital Signs








  Date Time  Temp Pulse Resp B/P (MAP) Pulse Ox O2 Delivery O2 Flow Rate FiO2


 


2/20/22 09:47     99   


 


2/20/22 09:39  82  111/58    


 


2/20/22 07:00 98.4  20   Room Air  





 98.4       














                                    I & O   


 


 2/19/22 2/19/22 2/20/22





 15:00 23:00 07:00


 


Intake Total   100 ml


 


Balance   100 ml











Physical Exam


General:  Alert


Heart:  Regular rate


Lungs:  Clear


Abdomen:  Normal bowel sounds


Extremities:  Other (Left knee swollen and warm with clean dry intact dressing 

and Evie drain (not hooked up currently))


Skin:  No rashes





Labs


Labs:





Laboratory Tests








Test


 2/19/22


14:40 2/19/22


15:10 2/19/22


15:40


 


White Blood Count


 9.1 x10^3/uL


(4.0-11.0) 


 





 


Red Blood Count


 4.13 x10^6/uL


(3.50-5.40) 


 





 


Hemoglobin


 10.9 g/dL


(12.0-15.5) 


 





 


Hematocrit


 33.9 %


(36.0-47.0) 


 





 


Mean Corpuscular Volume 82 fL ()   


 


Mean Corpuscular Hemoglobin 27 pg (25-35)   


 


Mean Corpuscular Hemoglobin


Concent 32 g/dL


(31-37) 


 





 


Red Cell Distribution Width


 14.0 %


(11.5-14.5) 


 





 


Platelet Count


 500 x10^3/uL


(140-400) 


 





 


Neutrophils (%) (Auto) 85 % (31-73)   


 


Lymphocytes (%) (Auto) 8 % (24-48)   


 


Monocytes (%) (Auto) 6 % (0-9)   


 


Eosinophils (%) (Auto) 1 % (0-3)   


 


Basophils (%) (Auto) 1 % (0-3)   


 


Neutrophils # (Auto)


 7.7 x10^3/uL


(1.8-7.7) 


 





 


Lymphocytes # (Auto)


 0.7 x10^3/uL


(1.0-4.8) 


 





 


Monocytes # (Auto)


 0.6 x10^3/uL


(0.0-1.1) 


 





 


Eosinophils # (Auto)


 0.1 x10^3/uL


(0.0-0.7) 


 





 


Basophils # (Auto)


 0.0 x10^3/uL


(0.0-0.2) 


 





 


Erythrocyte Sedimentation Rate 78 (0-25)   


 


Sodium Level


 143 mmol/L


(136-145) 


 





 


Potassium Level


 3.9 mmol/L


(3.5-5.1) 


 





 


Chloride Level


 104 mmol/L


() 


 





 


Carbon Dioxide Level


 31 mmol/L


(21-32) 


 





 


Anion Gap 8 (6-14)   


 


Blood Urea Nitrogen


 17 mg/dL


(7-20) 


 





 


Creatinine


 1.0 mg/dL


(0.6-1.0) 


 





 


Estimated GFR


(Cockcroft-Gault) 66.3 


 


 





 


BUN/Creatinine Ratio 17 (6-20)   


 


Glucose Level


 120 mg/dL


(70-99) 


 





 


Calcium Level


 8.8 mg/dL


(8.5-10.1) 


 





 


Total Bilirubin


 0.6 mg/dL


(0.2-1.0) 


 





 


Aspartate Amino Transf


(AST/SGOT) 21 U/L (15-37) 


 


 





 


Alanine Aminotransferase


(ALT/SGPT) 18 U/L (14-59) 


 


 





 


Alkaline Phosphatase


 99 U/L


() 


 





 


C-Reactive Protein,


Quantitative 144.3 mg/L


(0-3.3) 


 





 


Total Protein


 7.6 g/dL


(6.4-8.2) 


 





 


Albumin


 2.5 g/dL


(3.4-5.0) 


 





 


Albumin/Globulin Ratio 0.5 (1.0-1.7)   


 


Influenza Type A Antigen


 


 Negative


(NEGATIVE) 





 


Influenza Type B Antigen


 


 Negative


(NEGATIVE) 





 


SARS-CoV-2 Antigen (Rapid)


 


 Negative


(NEGATIVE) 





 


Urine Collection Type   Unknown 


 


Urine Color   Yellow 


 


Urine Clarity   Cloudy 


 


Urine pH   5.5 (<5.0-8.0) 


 


Urine Specific Gravity


 


 


 1.025


(1.000-1.030)


 


Urine Protein


 


 


 Negative mg/dL


(NEG-TRACE)


 


Urine Glucose (UA)


 


 


 Negative mg/dL


(NEG)


 


Urine Ketones (Stick)


 


 


 Negative mg/dL


(NEG)


 


Urine Blood   Negative (NEG) 


 


Urine Nitrite   Negative (NEG) 


 


Urine Bilirubin   Negative (NEG) 


 


Urine Urobilinogen Dipstick


 


 


 1.0 mg/dL (0.2


mg/dL)


 


Urine Leukocyte Esterase   Negative (NEG) 


 


Urine RBC   0 /HPF (0-2) 


 


Urine WBC   Occ /HPF (0-4) 


 


Urine Squamous Epithelial


Cells 


 


 Mod /LPF 





 


Urine Bacteria


 


 


 Few /HPF


(0-FEW)


 


Urine Mucus   Marked /LPF 











Assessment and Plan


Assessmemt and Plan


Problems


Medical Problems:


(1) Fever


Status: Acute  





(2) Post-operative infection


Status: Acute  





Left knee swelling pain (?  Infection versus inflammation in parentheses)


Recent knee replacement; polypharmacy; chronic 


anticoagulation after her recent surgery, she is on Coumadin; hyperlipidemia; 


chronic pain; arthritis; hypertension and GERD.





Plan


Awaiting Ortho input


Continue wound care


Considering antibiotics if Ortho agrees


Home meds


DVT prophylaxis


Full code


PT OT


Encourage p.o. intake


Trend lab





Comment


Review of Relevant


I have reviewed the following items vivien (where applicable) has been applied.


Medications:





Current Medications








 Medications


  (Trade)  Dose


 Ordered  Sig/Nicole


 Route


 PRN Reason  Start Time


 Stop Time Status Last Admin


Dose Admin


 


 Fentanyl Citrate


  (Fentanyl 2ml


 Vial)  50 mcg  1X  ONCE


 IVP


   2/19/22 14:15


 2/19/22 14:16 DC 2/19/22 14:52





 


 Ketorolac


 Tromethamine


  (Toradol 30mg


 Vial)  30 mg  1X  ONCE


 IVP


   2/19/22 14:15


 2/19/22 14:16 DC 2/19/22 14:52





 


 Amlodipine


 Besylate


  (Norvasc)  5 mg  DAILY


 PO


   2/20/22 09:00


    2/20/22 09:39





 


 Atorvastatin


 Calcium


  (Lipitor)  20 mg  QHS


 PO


   2/19/22 21:00


    2/19/22 20:44





 


 Potassium Chloride


  (Klor-Con)  20 meq  DAILY


 PO


   2/20/22 09:00


    2/20/22 09:40





 


 Multivitamins


  (Thera M Plus)  1 tab  DAILY


 PO


   2/20/22 09:00


    2/20/22 09:39





 


 Hydrochlorothiazide


  (Microzide)  12.5 mg  DAILY


 PO


   2/20/22 09:00


    2/20/22 09:39





 


 Oxycodone/


 Acetaminophen


  (Percocet 5/325)  1 tab  QIDPRN  PRN


 PO


 PAIN  2/19/22 17:45


    2/20/22 09:47





 


 Docusate Sodium


  (Colace)  100 mg  PRN DAILY  PRN


 PO


 HARD STOOLS  2/20/22 00:15


    2/20/22 00:24





 


 Senna/Docusate


 Sodium


  (Senna Plus)  1 tab  PRN BID  PRN


 PO


 CONSTIPATION  2/20/22 00:15


    2/20/22 09:39














Justifications for Admission


Other Justification














ELOY HUMPHREY III DO           Feb 20, 2022 12:21

## 2022-02-21 VITALS — DIASTOLIC BLOOD PRESSURE: 58 MMHG | SYSTOLIC BLOOD PRESSURE: 102 MMHG

## 2022-02-21 VITALS — SYSTOLIC BLOOD PRESSURE: 113 MMHG | DIASTOLIC BLOOD PRESSURE: 64 MMHG

## 2022-02-21 LAB — PROTHROMBIN TIME: 22.6 SEC (ref 11.7–14)

## 2022-02-21 RX ADMIN — Medication PRN EACH: at 11:38

## 2022-02-21 RX ADMIN — POTASSIUM CHLORIDE SCH MEQ: 1500 TABLET, EXTENDED RELEASE ORAL at 08:29

## 2022-02-21 RX ADMIN — MULTIPLE VITAMINS W/ MINERALS TAB SCH TAB: TAB at 08:28

## 2022-02-21 RX ADMIN — Medication PRN EACH: at 11:34

## 2022-02-21 NOTE — PDOC
ORTHO PROGRESS NOTES


DATE: 2/21/22 


TIME: 12:52


Subjective


Patient tells me she is having very little pain after knee surgery.  She was 

admitted after a provider told her they were concerned about infection in her 

knee.  She has been up and walking around with a walker.  She has not noticed 

any drainage at the dressing.


Vitals





Vital Signs








  Date Time  Temp Pulse Resp B/P (MAP) Pulse Ox O2 Delivery O2 Flow Rate FiO2


 


2/21/22 11:00 98.8 86 18 113/64 (80) 95 Room Air  





 98.8       


 


2/20/22 19:00       8.0 








Labs





Laboratory Tests








Test


 2/19/22


14:40 2/19/22


15:10 2/19/22


15:40 2/20/22


14:20


 


White Blood Count


 9.1 x10^3/uL


(4.0-11.0) 


 


 





 


Red Blood Count


 4.13 x10^6/uL


(3.50-5.40) 


 


 





 


Hemoglobin


 10.9 g/dL


(12.0-15.5) 


 


 





 


Hematocrit


 33.9 %


(36.0-47.0) 


 


 





 


Mean Corpuscular Volume 82 fL ()    


 


Mean Corpuscular Hemoglobin 27 pg (25-35)    


 


Mean Corpuscular Hemoglobin


Concent 32 g/dL


(31-37) 


 


 





 


Red Cell Distribution Width


 14.0 %


(11.5-14.5) 


 


 





 


Platelet Count


 500 x10^3/uL


(140-400) 


 


 





 


Neutrophils (%) (Auto) 85 % (31-73)    


 


Lymphocytes (%) (Auto) 8 % (24-48)    


 


Monocytes (%) (Auto) 6 % (0-9)    


 


Eosinophils (%) (Auto) 1 % (0-3)    


 


Basophils (%) (Auto) 1 % (0-3)    


 


Neutrophils # (Auto)


 7.7 x10^3/uL


(1.8-7.7) 


 


 





 


Lymphocytes # (Auto)


 0.7 x10^3/uL


(1.0-4.8) 


 


 





 


Monocytes # (Auto)


 0.6 x10^3/uL


(0.0-1.1) 


 


 





 


Eosinophils # (Auto)


 0.1 x10^3/uL


(0.0-0.7) 


 


 





 


Basophils # (Auto)


 0.0 x10^3/uL


(0.0-0.2) 


 


 





 


Erythrocyte Sedimentation Rate 78 (0-25)    


 


Sodium Level


 143 mmol/L


(136-145) 


 


 





 


Potassium Level


 3.9 mmol/L


(3.5-5.1) 


 


 





 


Chloride Level


 104 mmol/L


() 


 


 





 


Carbon Dioxide Level


 31 mmol/L


(21-32) 


 


 





 


Anion Gap 8 (6-14)    


 


Blood Urea Nitrogen


 17 mg/dL


(7-20) 


 


 





 


Creatinine


 1.0 mg/dL


(0.6-1.0) 


 


 





 


Estimated GFR


(Cockcroft-Gault) 66.3 


 


 


 





 


BUN/Creatinine Ratio 17 (6-20)    


 


Glucose Level


 120 mg/dL


(70-99) 


 


 





 


Calcium Level


 8.8 mg/dL


(8.5-10.1) 


 


 





 


Total Bilirubin


 0.6 mg/dL


(0.2-1.0) 


 


 





 


Aspartate Amino Transf


(AST/SGOT) 21 U/L (15-37) 


 


 


 





 


Alanine Aminotransferase


(ALT/SGPT) 18 U/L (14-59) 


 


 


 





 


Alkaline Phosphatase


 99 U/L


() 


 


 





 


C-Reactive Protein,


Quantitative 144.3 mg/L


(0-3.3) 


 


 





 


Total Protein


 7.6 g/dL


(6.4-8.2) 


 


 





 


Albumin


 2.5 g/dL


(3.4-5.0) 


 


 





 


Albumin/Globulin Ratio 0.5 (1.0-1.7)    


 


Influenza Type A Antigen


 


 Negative


(NEGATIVE) 


 





 


Influenza Type B Antigen


 


 Negative


(NEGATIVE) 


 





 


SARS-CoV-2 Antigen (Rapid)


 


 Negative


(NEGATIVE) 


 





 


Urine Collection Type   Unknown  


 


Urine Color   Yellow  


 


Urine Clarity   Cloudy  


 


Urine pH   5.5 (<5.0-8.0)  


 


Urine Specific Gravity


 


 


 1.025


(1.000-1.030) 





 


Urine Protein


 


 


 Negative mg/dL


(NEG-TRACE) 





 


Urine Glucose (UA)


 


 


 Negative mg/dL


(NEG) 





 


Urine Ketones (Stick)


 


 


 Negative mg/dL


(NEG) 





 


Urine Blood   Negative (NEG)  


 


Urine Nitrite   Negative (NEG)  


 


Urine Bilirubin   Negative (NEG)  


 


Urine Urobilinogen Dipstick


 


 


 1.0 mg/dL (0.2


mg/dL) 





 


Urine Leukocyte Esterase   Negative (NEG)  


 


Urine RBC   0 /HPF (0-2)  


 


Urine WBC   Occ /HPF (0-4)  


 


Urine Squamous Epithelial


Cells 


 


 Mod /LPF 


 





 


Urine Bacteria


 


 


 Few /HPF


(0-FEW) 





 


Urine Mucus   Marked /LPF  


 


Prothrombin Time


 


 


 


 22.2 SEC


(11.7-14.0)


 


Prothromb Time International


Ratio 


 


 


 2.0 (0.8-1.1) 





 


Test


 2/21/22


05:55 


 


 





 


Prothrombin Time


 22.6 SEC


(11.7-14.0) 


 


 





 


Prothromb Time International


Ratio 2.0 (0.8-1.1) 


 


 


 











Laboratory Tests








Test


 2/20/22


14:20 2/21/22


05:55


 


Prothrombin Time


 22.2 SEC


(11.7-14.0) 22.6 SEC


(11.7-14.0)


 


Prothromb Time International


Ratio 2.0 (0.8-1.1) 


 2.0 (0.8-1.1) 











Notes


She is awake and alert, lying in bed.  Normal motor and sensation are present in

her left lower extremity.  Incisional wound VAC in place with no drainage.  Mild

effusion is present at her knee.  Expected warmth is also present around the 

area


Problems:  


(1) History of left knee replacement


Assessment and Plan


I did provide reassurance, I do not think this represents an infection but 

rather normal postoperative course.  I am okay if she goes home.  She should 

continue PT.  I will see her back in a week.  I asked her to take her 

temperature at home as well.











KENDRICK HARDING II, MD        Feb 21, 2022 12:53

## 2022-02-21 NOTE — NUR
Pharmacy Warfarin Dosing Note



S:Pharmacy consulted to assist with anticoagulation therapy started  with target INR: 1.6 - 
2.5





O:JANAK VOSS is a 71 year old F with 

TKA 



LABS:

Last INR: 2.0 

Last HGB: 10.9 

Last HCT: 33.9 

Last PLT: 500 



Last dose of 4 mg given on 02/20/22 at 1731 

Previous Regimen: 

Vitamin K given: N 

Drug Interaction Changes: 

Ongoing Drug Interactions: 







A:INR of 2.0  is within desired range. Target range for this patient is: 1.6 - 2.5



P: Warfarin dose: 4 mg Today at 1600

Bridge Therapy: None  

Next INR due tomorrow.

Pharmacy anticoagulation service will continue to follow.



 Pablo Delaney [facility], 02/21/22 [id number]

## 2022-02-21 NOTE — DS
DATE OF DISCHARGE: 02/21/2022

ADMITTING DIAGNOSIS:  Left knee swelling and pain, possible infection.



DISCHARGE DIAGNOSES:  Resolving left knee swelling; resolving pain; recent knee 

replacement; anticoagulation; hyperlipidemia; chronic pain; arthritis; 

hypertension; and gastroesophageal reflux disease.



CONSULTS:  Orthopedics.



PROCEDURES:  None.



HOSPITAL COURSE:  The patient is a pleasant, _____-aged female who had her knee 

replaced a few weeks ago and basically presented with a warm, swollen knee.  We 

were concerned it might be infected.  I observed her over the weekend.  I spoke 

with Dr. Cummings this morning.  He is going to come to see the patient today at 

noon, but thinks she might be able to go home.  We plan to discharge if okay 

with him.



DISPOSITION:  Home.



ACTIVITY:  As tolerated.



DIET:  Low sodium.



MEDICATIONS:  Please see the MRAD.  Percocet 5 q.4 hours p.r.n., amlodipine 5 a 

day, atorvastatin 20 a day, hydrochlorothiazide 12.5 a day, vitamins, omeprazole

40 a day, potassium chloride 20 a day, senna, and Coumadin 4 a day.



TOTAL TIME:  32 minutes.







KYLEE/JUNAID

DR: KYLEE/bean   DD: 02/21/2022 10:49

DT: 02/21/2022 11:22   TID: [id number]

## 2022-02-21 NOTE — NUR
SW following. Discussed with RN, pt from home, room air, cardiac diet, rapid COVID-19 
negative. Ortho following knee. Discharge order for home after seen by ortho. RN advised no 
SW needs at this time. SW will continue to follow.

## 2022-02-21 NOTE — SNU/HH DC
DISCHARGE WITH HOME HEALTH


DISCHARGE INFORMATION:


Final Diagnosis:


Problems


Medical Problems:


(1) Fever


Status: Acute  





(2) Post-operative infection


Status: Acute  








Condition on Discharge:  Stable





CODE STATUS:


Code Status:  Full





HOME HEALTH:


Face to Face:


I certify this patient is under my care and that I, or a nurse practitioner or 

physician's assistant working with me, had a face to face encounter that meets 

the physician face to face encounter requirements with this patient on [].


Medical Complications:  DJD, Other (Recent knee replacement)


Skilled Nursing For:  Assess & Educate Safety


RN For Eval/Treatment:  Yes


Physical Therapy For:  Evalulation/Treatment


Occupational Therapy For:  Evaluation/Treatment


Home Health Aide For:  Self-care


MSW For:  Community Resources


Pt Meets Homebound Status:  Unsteady balance w/ amb,





POST DISCHARGE ORDERS:


Activity Instructions for Disc:  Progressive ambulation


Weight Bearing Status after Di:  No restrictions


Bathing Instructions:  Shower-keep dressing dry, No Tub Bath until see 


DIET AFTER DISCHARGE:  Cardiac


Wound/Incision Care:  Ice to area for comfort, Keep wound elevated, Do not 

change dressing, Reinforce dressing PRN





TREATMENT/EQUIPMENT ORDERS:


Adaptive Equipment Issued:  None





CERTIFICATION STATEMENT:


Certification Statement:


Certification Statement: Based on the above finding, I certify that this patient

 is confined to the home and needs intermittent skilled nursing care, physical 

therapy and/or speech therapy, or continues to need occupational therapy.~ This 

patient is under my care, and I have initiated the establishment of the plan of 

care.~ This patient will be followed by myself or a community physician who will

 periodically review the plan of care.


Home Meds


Active Scripts


Oxycodone/Apap 5-325 (PERCOCET 5-325 MG TABLET **) 1 Each Tablet, 1 TAB PO PRN 

Q4HRS PRN for PAIN for 7 Days, #14 TAB


   Prov:CASTLE,NIAL K III DO         2/21/22


Oxycodone HCl/Acetaminophen (Percocet 5-325 mg Tablet) 1 Each Tablet, 1 TAB PO 

QIDPRN PRN for PAIN MDD 4 Tablet(s), #15 TAB 0 Refills


   Prov:JOSELYN ABRAMS MD         2/15/22


Reported Medications


Hydrochlorothiazide (HYDROCHLOROTHIAZIDE CAPSULE  **) 12.5 Mg Capsule, 12.5 MG 

PO DAILY for DIURETIC, CAP 0 Refills


   2/19/22


Warfarin Sodium (JANTOVEN) 4 Mg Tablet, 1 TAB PO DAILY for blood thinner


   2/19/22


Multivits-Min/Iron/FA/Lutein (Centrum Silver Women Tablet) 1 Each Tablet, 1 EACH

 PO DAILY for supplement, TAB


   2/1/22


Sennosides/Docusate Sodium (Stool Softener Tablet) 1 Each Tablet, 1 EACH PO 

DAILY for prevent constipation, TAB


   2/1/22


Potassium Chloride (KLOR-CON M20) 20 Meq Tab.er.prt, 20 MEQ PO DAILY for K= 

supplement, TAB.SR


   2/1/22


Amlodipine Besylate (AMLODIPINE BESYLATE) 5 Mg Tablet, 5 MG PO


   5/9/14


Hydrochlorothiazide (HYDROCHLOROTHIAZIDE TABLET) 12.5 Mg Tablet, 12.5 MG PO


   5/9/14


Atorvastatin Calcium (ATORVASTATIN CALCIUM) 20 Mg Tablet, 20 MG PO


   5/9/14


Omeprazole (OMEPRAZOLE) 40 Mg Capsule.dr, 40 MG PO


   5/9/14











ELOY HUMPHREY III DO           Feb 21, 2022 10:38

## 2022-02-21 NOTE — NUR
Pt left unit at 1300 by wheelchair via private vehicle, accompanied by  and sister. 
Pt's IV removed without complications, VSS. Discharge paperwork discussed with pt. Pt 
verbalizes understanding, additional questions addressed.

## 2022-02-21 NOTE — PDOC
[employer] PROGRESS NOTE


Date of Service


DOS:


DATE: 2/21/22 


TIME: 10:53





Chief Complaint


Chief Complaint


Left knee swelling pain (?  Infection versus inflammation in parentheses)


Recent knee replacement; polypharmacy; chronic 


anticoagulation after her recent surgery, she is on Coumadin; hyperlipidemia; 


chronic pain; arthritis; hypertension and GERD.





History of Present Illness


History of Present Illness


2/21/2022


Patient seen and examined


Discussed with RN


Chart reviewed


Discussed with Dr. Madrigal


He is no check on the patient this afternoon








2/20/2022


Patient seen and examined


Discussed with RN


Chart reviewed


The left knee is still painful swollen and warm to touch


Awaiting Ortho input





Vitals/I&O


Vitals/I&O:





                                   Vital Signs








  Date Time  Temp Pulse Resp B/P (MAP) Pulse Ox O2 Delivery O2 Flow Rate FiO2


 


2/21/22 08:29  76  102/58    


 


2/21/22 08:20      Room Air  


 


2/21/22 07:00 98.6  20  95   





 98.6       


 


2/20/22 19:00       8.0 














                                    I & O   


 


 2/20/22 2/20/22 2/21/22





 15:00 23:00 07:00


 


Intake Total   300 ml


 


Balance   300 ml











Physical Exam


General:  Alert, Cooperative


Heart:  Regular rate


Lungs:  Clear


Abdomen:  Soft


Extremities:  No edema, Normal pulses, Other (Left knee GWEN dressing was 

removed for exam, and reapplied. The incision is benign. There was report of 

some drainage from the Hemovac site but it is a single pinpoint spot of blood 

and appears benign. The incision is dry and intact and there is no erythema. 

There is a trace effusion which seems normal postoperative. There is a small 

amount of faint ecchymosis on the lateral posterior portion of the knee. The 

calf is soft and nontender and the Homans' sign is negative. Range of motion is 

fairly limited, only about 30 to 60 degrees.)


Skin:  No significant lesion





Labs


Labs:





Laboratory Tests








Test


 2/20/22


14:20 2/21/22


05:55


 


Prothrombin Time


 22.2 SEC


(11.7-14.0) 22.6 SEC


(11.7-14.0)


 


Prothromb Time International


Ratio 2.0 (0.8-1.1) 


 2.0 (0.8-1.1) 














Assessment and Plan


Assessmemt and Plan


Problems


Medical Problems:


(1) Fever


Status: Acute  





(2) Post-operative infection


Status: Acute  





Left knee swelling pain (?  Infection versus inflammation in parentheses)


Recent knee replacement; polypharmacy; chronic 


anticoagulation after her recent surgery, she is on Coumadin; hyperlipidemia; 


chronic pain; arthritis; hypertension and GERD.





Plan


Awaiting Ortho input but will probably discharge this afternoon


For now continue the following;


Continue wound care


Home meds


DVT prophylaxis


Full code


PT OT


Encourage p.o. intake


Trend lab





Comment


Review of Relevant


I have reviewed the following items vivien (where applicable) has been applied.


Medications:





Current Medications








 Medications


  (Trade)  Dose


 Ordered  Sig/Nicole


 Route


 PRN Reason  Start Time


 Stop Time Status Last Admin


Dose Admin


 


 Warfarin Sodium


  (Coumadin)  4 mg  1X WARF  ONCE


 PO


   2/20/22 16:00


 2/20/22 16:01 DC 2/20/22 17:31





 


 Oxycodone/


 Acetaminophen


  (Percocet 5/325)  1 tab  PRN Q4HRS  PRN


 PO


 PAIN  2/20/22 20:45


    2/21/22 08:28





 


 Polyethylene


 Glycol


  (miraLAX PACKET)  17 gm  DAILY


 PO


   2/21/22 09:00


    2/21/22 08:29














Justifications for Admission


Other Justification














ELOY HUMPHREY III DO           Feb 21, 2022 10:54